# Patient Record
Sex: FEMALE | Race: WHITE | NOT HISPANIC OR LATINO
[De-identification: names, ages, dates, MRNs, and addresses within clinical notes are randomized per-mention and may not be internally consistent; named-entity substitution may affect disease eponyms.]

---

## 2017-02-15 ENCOUNTER — APPOINTMENT (OUTPATIENT)
Dept: HOME HEALTH SERVICES | Facility: HOME HEALTH | Age: 82
End: 2017-02-15

## 2017-02-15 VITALS
HEART RATE: 65 BPM | RESPIRATION RATE: 16 BRPM | SYSTOLIC BLOOD PRESSURE: 130 MMHG | DIASTOLIC BLOOD PRESSURE: 80 MMHG | OXYGEN SATURATION: 97 %

## 2017-02-15 DIAGNOSIS — Z87.898 PERSONAL HISTORY OF OTHER SPECIFIED CONDITIONS: ICD-10-CM

## 2017-05-10 ENCOUNTER — APPOINTMENT (OUTPATIENT)
Dept: HOME HEALTH SERVICES | Facility: HOME HEALTH | Age: 82
End: 2017-05-10

## 2017-05-10 VITALS
DIASTOLIC BLOOD PRESSURE: 80 MMHG | OXYGEN SATURATION: 96 % | RESPIRATION RATE: 16 BRPM | SYSTOLIC BLOOD PRESSURE: 160 MMHG | HEART RATE: 70 BPM

## 2017-09-14 ENCOUNTER — APPOINTMENT (OUTPATIENT)
Dept: HOME HEALTH SERVICES | Facility: HOME HEALTH | Age: 82
End: 2017-09-14
Payer: MEDICARE

## 2017-09-14 VITALS
HEART RATE: 71 BPM | SYSTOLIC BLOOD PRESSURE: 150 MMHG | RESPIRATION RATE: 16 BRPM | OXYGEN SATURATION: 97 % | DIASTOLIC BLOOD PRESSURE: 80 MMHG

## 2017-09-14 PROCEDURE — 99349 HOME/RES VST EST MOD MDM 40: CPT

## 2017-12-05 ENCOUNTER — CLINICAL ADVICE (OUTPATIENT)
Age: 82
End: 2017-12-05

## 2017-12-06 ENCOUNTER — CLINICAL ADVICE (OUTPATIENT)
Age: 82
End: 2017-12-06

## 2017-12-07 ENCOUNTER — APPOINTMENT (OUTPATIENT)
Dept: HOME HEALTH SERVICES | Facility: HOME HEALTH | Age: 82
End: 2017-12-07

## 2017-12-07 VITALS
TEMPERATURE: 98 F | SYSTOLIC BLOOD PRESSURE: 108 MMHG | RESPIRATION RATE: 16 BRPM | HEART RATE: 74 BPM | OXYGEN SATURATION: 98 % | DIASTOLIC BLOOD PRESSURE: 70 MMHG

## 2017-12-21 ENCOUNTER — APPOINTMENT (OUTPATIENT)
Dept: HOME HEALTH SERVICES | Facility: HOME HEALTH | Age: 82
End: 2017-12-21

## 2018-01-22 ENCOUNTER — APPOINTMENT (OUTPATIENT)
Dept: HOME HEALTH SERVICES | Facility: HOME HEALTH | Age: 83
End: 2018-01-22
Payer: MEDICARE

## 2018-01-22 VITALS
HEART RATE: 71 BPM | OXYGEN SATURATION: 95 % | DIASTOLIC BLOOD PRESSURE: 70 MMHG | SYSTOLIC BLOOD PRESSURE: 120 MMHG | RESPIRATION RATE: 16 BRPM

## 2018-01-22 PROCEDURE — 99349 HOME/RES VST EST MOD MDM 40: CPT

## 2018-03-08 ENCOUNTER — MEDICATION RENEWAL (OUTPATIENT)
Age: 83
End: 2018-03-08

## 2018-03-09 ENCOUNTER — RX RENEWAL (OUTPATIENT)
Age: 83
End: 2018-03-09

## 2018-05-17 ENCOUNTER — APPOINTMENT (OUTPATIENT)
Dept: HOME HEALTH SERVICES | Facility: HOME HEALTH | Age: 83
End: 2018-05-17
Payer: MEDICARE

## 2018-05-17 VITALS
SYSTOLIC BLOOD PRESSURE: 130 MMHG | DIASTOLIC BLOOD PRESSURE: 70 MMHG | RESPIRATION RATE: 16 BRPM | OXYGEN SATURATION: 95 % | HEART RATE: 80 BPM

## 2018-05-17 DIAGNOSIS — Z71.89 OTHER SPECIFIED COUNSELING: ICD-10-CM

## 2018-05-17 DIAGNOSIS — E55.9 VITAMIN D DEFICIENCY, UNSPECIFIED: ICD-10-CM

## 2018-05-17 PROCEDURE — 99348 HOME/RES VST EST LOW MDM 30: CPT

## 2018-10-11 ENCOUNTER — APPOINTMENT (OUTPATIENT)
Dept: HOME HEALTH SERVICES | Facility: HOME HEALTH | Age: 83
End: 2018-10-11

## 2018-12-26 ENCOUNTER — APPOINTMENT (OUTPATIENT)
Dept: HOME HEALTH SERVICES | Facility: HOME HEALTH | Age: 83
End: 2018-12-26
Payer: MEDICARE

## 2018-12-26 VITALS
SYSTOLIC BLOOD PRESSURE: 160 MMHG | RESPIRATION RATE: 16 BRPM | DIASTOLIC BLOOD PRESSURE: 80 MMHG | OXYGEN SATURATION: 94 % | HEART RATE: 94 BPM

## 2018-12-26 PROCEDURE — G0008: CPT

## 2018-12-26 PROCEDURE — 90662 IIV NO PRSV INCREASED AG IM: CPT

## 2018-12-26 PROCEDURE — 99348 HOME/RES VST EST LOW MDM 30: CPT | Mod: 25

## 2019-03-07 ENCOUNTER — APPOINTMENT (OUTPATIENT)
Dept: HOME HEALTH SERVICES | Facility: HOME HEALTH | Age: 84
End: 2019-03-07

## 2019-04-29 ENCOUNTER — APPOINTMENT (OUTPATIENT)
Dept: HOME HEALTH SERVICES | Facility: HOME HEALTH | Age: 84
End: 2019-04-29

## 2019-06-05 ENCOUNTER — APPOINTMENT (OUTPATIENT)
Dept: HOME HEALTH SERVICES | Facility: HOME HEALTH | Age: 84
End: 2019-06-05

## 2019-06-10 ENCOUNTER — APPOINTMENT (OUTPATIENT)
Dept: HOME HEALTH SERVICES | Facility: HOME HEALTH | Age: 84
End: 2019-06-10

## 2019-08-22 ENCOUNTER — APPOINTMENT (OUTPATIENT)
Dept: HOME HEALTH SERVICES | Facility: HOME HEALTH | Age: 84
End: 2019-08-22
Payer: MEDICARE

## 2019-08-22 VITALS
OXYGEN SATURATION: 96 % | RESPIRATION RATE: 16 BRPM | DIASTOLIC BLOOD PRESSURE: 80 MMHG | HEART RATE: 73 BPM | SYSTOLIC BLOOD PRESSURE: 140 MMHG

## 2019-08-22 DIAGNOSIS — R60.0 LOCALIZED EDEMA: ICD-10-CM

## 2019-08-22 DIAGNOSIS — H91.93 UNSPECIFIED HEARING LOSS, BILATERAL: ICD-10-CM

## 2019-08-22 PROCEDURE — 99349 HOME/RES VST EST MOD MDM 40: CPT

## 2019-08-22 NOTE — PHYSICAL EXAM
[No Acute Distress] : no acute distress [Normal Sclera/Conjunctiva] : normal sclera/conjunctiva [Normal Outer Ear/Nose] : the ears and nose were normal in appearance [No JVD] : no jugular venous distention [No Respiratory Distress] : no respiratory distress [Clear to Auscultation] : lungs were clear to auscultation bilaterally [Normal Rate] : heart rate was normal  [Regular Rhythm] : with a regular rhythm [Normal S1, S2] : normal S1 and S2 [No Murmurs] : no murmurs heard [Normal Bowel Sounds] : normal bowel sounds [Non Tender] : non-tender [Soft] : abdomen soft [No Joint Swelling] : no joint swelling seen [No Rash] : no rash [No Motor Deficits] : the motor exam was normal [Normal Affect] : the affect was normal [de-identified] : pleasant [de-identified] : no edema

## 2019-08-22 NOTE — COUNSELING
[Full Code] : Code Status: Full Code [Completed Medical Orders for Life-Sustaining Treatment] : completed medical orders for life-sustaining treatment [Limited] : Treatment Guidelines: Limited [Trial of Intubation] : Intubation: Trial of Intubation [Continue diet as tolerated] : continue diet as tolerated based on goals of care [Normal Weight - ( BMI  <25 )] : normal weight - ( BMI  <25 ) [Non - Smoker] : non-smoker [Smoke/CO Detectors] : smoke/CO detectors [Medical alert] : medical alert [Use grab bars] : use grab bars [Remove clutter and unsafe carpeting to avoid falls] : remove clutter and unsafe carpeting to avoid falls [Use assistive device to avoid falls] : use assistive device to avoid falls [Last Verification Date: _____] : [facility] Completion/last verification date: [unfilled]

## 2019-08-22 NOTE — HISTORY OF PRESENT ILLNESS
[Patient] : patient [Family Member] : family member [FreeTextEntry1] : gait instability [FreeTextEntry2] : PMH HTN, peripheral edema, mild dementia  \par \par \par Dementia- forgetful, repetitive.  no trouble swallowing. Some incontinence. Sometimes screams for unclear reasons. wants to go out. \par edema- Rarely uses lasix 20 PRN. \par appetite good, no trouble swallowing, no constipation, sleeps well

## 2019-10-03 ENCOUNTER — APPOINTMENT (OUTPATIENT)
Dept: HOME HEALTH SERVICES | Facility: HOME HEALTH | Age: 84
End: 2019-10-03

## 2019-10-10 ENCOUNTER — APPOINTMENT (OUTPATIENT)
Dept: HOME HEALTH SERVICES | Facility: HOME HEALTH | Age: 84
End: 2019-10-10

## 2019-11-01 ENCOUNTER — APPOINTMENT (OUTPATIENT)
Dept: HOME HEALTH SERVICES | Facility: HOME HEALTH | Age: 84
End: 2019-11-01

## 2019-12-10 ENCOUNTER — APPOINTMENT (OUTPATIENT)
Dept: HOME HEALTH SERVICES | Facility: HOME HEALTH | Age: 84
End: 2019-12-10

## 2020-01-02 ENCOUNTER — APPOINTMENT (OUTPATIENT)
Dept: HOME HEALTH SERVICES | Facility: HOME HEALTH | Age: 85
End: 2020-01-02
Payer: MEDICARE

## 2020-01-02 VITALS
OXYGEN SATURATION: 94 % | RESPIRATION RATE: 16 BRPM | SYSTOLIC BLOOD PRESSURE: 120 MMHG | HEART RATE: 75 BPM | DIASTOLIC BLOOD PRESSURE: 75 MMHG

## 2020-01-02 DIAGNOSIS — F03.90 UNSPECIFIED DEMENTIA W/OUT BEHAVIORAL DISTURBANCE: ICD-10-CM

## 2020-01-02 DIAGNOSIS — I10 ESSENTIAL (PRIMARY) HYPERTENSION: ICD-10-CM

## 2020-01-02 DIAGNOSIS — Z23 ENCOUNTER FOR IMMUNIZATION: ICD-10-CM

## 2020-01-02 PROCEDURE — 99348 HOME/RES VST EST LOW MDM 30: CPT | Mod: 25

## 2020-01-02 PROCEDURE — G0008: CPT

## 2020-01-02 PROCEDURE — 90662 IIV NO PRSV INCREASED AG IM: CPT

## 2020-01-02 NOTE — COUNSELING
[Normal Weight - ( BMI  <25 )] : normal weight - ( BMI  <25 ) [Continue diet as tolerated] : continue diet as tolerated based on goals of care [Smoke/CO Detectors] : smoke/CO detectors [Non - Smoker] : non-smoker [Medical alert] : medical alert [Use grab bars] : use grab bars [Use assistive device to avoid falls] : use assistive device to avoid falls [Remove clutter and unsafe carpeting to avoid falls] : remove clutter and unsafe carpeting to avoid falls [Completed Medical Orders for Life-Sustaining Treatment] : completed medical orders for life-sustaining treatment [Full Code] : Code Status: Full Code [Limited] : Treatment Guidelines: Limited [[facility] Date: _____] [facility] Completion/last verification date: [unfilled] [Trial of Intubation] : Intubation: Trial of Intubation

## 2020-01-02 NOTE — HISTORY OF PRESENT ILLNESS
[Patient] : patient [Family Member] : family member [FreeTextEntry1] : gait instability [FreeTextEntry2] : PMH HTN, peripheral edema, mild dementia  \par \par \par Dementia- forgetful, repetitive.  no trouble swallowing. Some incontinence. no falls\par edema- Rarely uses lasix 20 PRN. \par appetite good,  no constipation, sleeps well

## 2020-01-02 NOTE — PHYSICAL EXAM
[No Acute Distress] : no acute distress [Normal Sclera/Conjunctiva] : normal sclera/conjunctiva [Normal Outer Ear/Nose] : the ears and nose were normal in appearance [No JVD] : no jugular venous distention [Normal Rate] : heart rate was normal  [No Respiratory Distress] : no respiratory distress [Clear to Auscultation] : lungs were clear to auscultation bilaterally [Regular Rhythm] : with a regular rhythm [Normal S1, S2] : normal S1 and S2 [No Murmurs] : no murmurs heard [Normal Bowel Sounds] : normal bowel sounds [Non Tender] : non-tender [Soft] : abdomen soft [No Joint Swelling] : no joint swelling seen [No Motor Deficits] : the motor exam was normal [No Rash] : no rash [Normal Affect] : the affect was normal [de-identified] : pleasant [de-identified] : no edema

## 2020-02-22 ENCOUNTER — TRANSCRIPTION ENCOUNTER (OUTPATIENT)
Age: 85
End: 2020-02-22

## 2020-02-22 ENCOUNTER — INPATIENT (INPATIENT)
Facility: HOSPITAL | Age: 85
LOS: 4 days | Discharge: HOME | DRG: 563 | End: 2020-02-27
Attending: INTERNAL MEDICINE | Admitting: STUDENT IN AN ORGANIZED HEALTH CARE EDUCATION/TRAINING PROGRAM
Payer: MEDICARE

## 2020-02-22 VITALS
OXYGEN SATURATION: 96 % | DIASTOLIC BLOOD PRESSURE: 78 MMHG | RESPIRATION RATE: 18 BRPM | SYSTOLIC BLOOD PRESSURE: 115 MMHG | TEMPERATURE: 97 F | HEART RATE: 86 BPM | WEIGHT: 100.09 LBS

## 2020-02-22 DIAGNOSIS — Z02.9 ENCOUNTER FOR ADMINISTRATIVE EXAMINATIONS, UNSPECIFIED: ICD-10-CM

## 2020-02-22 DIAGNOSIS — F03.90 UNSPECIFIED DEMENTIA WITHOUT BEHAVIORAL DISTURBANCE: ICD-10-CM

## 2020-02-22 DIAGNOSIS — R26.2 DIFFICULTY IN WALKING, NOT ELSEWHERE CLASSIFIED: ICD-10-CM

## 2020-02-22 DIAGNOSIS — R60.0 LOCALIZED EDEMA: ICD-10-CM

## 2020-02-22 DIAGNOSIS — S43.006A UNSPECIFIED DISLOCATION OF UNSPECIFIED SHOULDER JOINT, INITIAL ENCOUNTER: ICD-10-CM

## 2020-02-22 DIAGNOSIS — S42.291A OTHER DISPLACED FRACTURE OF UPPER END OF RIGHT HUMERUS, INITIAL ENCOUNTER FOR CLOSED FRACTURE: ICD-10-CM

## 2020-02-22 DIAGNOSIS — R79.89 OTHER SPECIFIED ABNORMAL FINDINGS OF BLOOD CHEMISTRY: ICD-10-CM

## 2020-02-22 LAB
ALBUMIN SERPL ELPH-MCNC: 4 G/DL — SIGNIFICANT CHANGE UP (ref 3.3–5)
ALP SERPL-CCNC: 120 U/L — SIGNIFICANT CHANGE UP (ref 40–120)
ALT FLD-CCNC: 6 U/L — LOW (ref 10–45)
ANION GAP SERPL CALC-SCNC: 14 MMOL/L — SIGNIFICANT CHANGE UP (ref 5–17)
ANION GAP SERPL CALC-SCNC: 15 MMOL/L — SIGNIFICANT CHANGE UP (ref 5–17)
APTT BLD: 27.8 SEC — SIGNIFICANT CHANGE UP (ref 27.5–36.3)
AST SERPL-CCNC: 24 U/L — SIGNIFICANT CHANGE UP (ref 10–40)
BASOPHILS # BLD AUTO: 0 K/UL — SIGNIFICANT CHANGE UP (ref 0–0.2)
BASOPHILS NFR BLD AUTO: 0 % — SIGNIFICANT CHANGE UP (ref 0–2)
BILIRUB SERPL-MCNC: 0.6 MG/DL — SIGNIFICANT CHANGE UP (ref 0.2–1.2)
BUN SERPL-MCNC: 25 MG/DL — HIGH (ref 7–23)
BUN SERPL-MCNC: 26 MG/DL — HIGH (ref 7–23)
CALCIUM SERPL-MCNC: 8.7 MG/DL — SIGNIFICANT CHANGE UP (ref 8.4–10.5)
CALCIUM SERPL-MCNC: 9.2 MG/DL — SIGNIFICANT CHANGE UP (ref 8.4–10.5)
CHLORIDE SERPL-SCNC: 104 MMOL/L — SIGNIFICANT CHANGE UP (ref 96–108)
CHLORIDE SERPL-SCNC: 106 MMOL/L — SIGNIFICANT CHANGE UP (ref 96–108)
CK MB CFR SERPL CALC: 7.7 NG/ML — HIGH (ref 0–3.8)
CK SERPL-CCNC: 125 U/L — SIGNIFICANT CHANGE UP (ref 25–170)
CK SERPL-CCNC: 69 U/L — SIGNIFICANT CHANGE UP (ref 25–170)
CO2 SERPL-SCNC: 21 MMOL/L — LOW (ref 22–31)
CO2 SERPL-SCNC: 22 MMOL/L — SIGNIFICANT CHANGE UP (ref 22–31)
CREAT SERPL-MCNC: 0.76 MG/DL — SIGNIFICANT CHANGE UP (ref 0.5–1.3)
CREAT SERPL-MCNC: 0.79 MG/DL — SIGNIFICANT CHANGE UP (ref 0.5–1.3)
EOSINOPHIL # BLD AUTO: 0 K/UL — SIGNIFICANT CHANGE UP (ref 0–0.5)
EOSINOPHIL NFR BLD AUTO: 0 % — SIGNIFICANT CHANGE UP (ref 0–6)
GAS PNL BLDV: SIGNIFICANT CHANGE UP
GLUCOSE SERPL-MCNC: 128 MG/DL — HIGH (ref 70–99)
GLUCOSE SERPL-MCNC: 148 MG/DL — HIGH (ref 70–99)
HCT VFR BLD CALC: 33.8 % — LOW (ref 34.5–45)
HCT VFR BLD CALC: 40.7 % — SIGNIFICANT CHANGE UP (ref 34.5–45)
HGB BLD-MCNC: 10.9 G/DL — LOW (ref 11.5–15.5)
HGB BLD-MCNC: 13.1 G/DL — SIGNIFICANT CHANGE UP (ref 11.5–15.5)
INR BLD: 0.97 RATIO — SIGNIFICANT CHANGE UP (ref 0.88–1.16)
LYMPHOCYTES # BLD AUTO: 0.74 K/UL — LOW (ref 1–3.3)
LYMPHOCYTES # BLD AUTO: 6.2 % — LOW (ref 13–44)
MCHC RBC-ENTMCNC: 31.1 PG — SIGNIFICANT CHANGE UP (ref 27–34)
MCHC RBC-ENTMCNC: 31.3 PG — SIGNIFICANT CHANGE UP (ref 27–34)
MCHC RBC-ENTMCNC: 32.2 GM/DL — SIGNIFICANT CHANGE UP (ref 32–36)
MCHC RBC-ENTMCNC: 32.2 GM/DL — SIGNIFICANT CHANGE UP (ref 32–36)
MCV RBC AUTO: 96.3 FL — SIGNIFICANT CHANGE UP (ref 80–100)
MCV RBC AUTO: 97.4 FL — SIGNIFICANT CHANGE UP (ref 80–100)
MONOCYTES # BLD AUTO: 0.72 K/UL — SIGNIFICANT CHANGE UP (ref 0–0.9)
MONOCYTES NFR BLD AUTO: 6.1 % — SIGNIFICANT CHANGE UP (ref 2–14)
NEUTROPHILS # BLD AUTO: 10.4 K/UL — HIGH (ref 1.8–7.4)
NEUTROPHILS NFR BLD AUTO: 87.7 % — HIGH (ref 43–77)
NRBC # BLD: 0 /100 WBCS — SIGNIFICANT CHANGE UP (ref 0–0)
NT-PROBNP SERPL-SCNC: 4085 PG/ML — HIGH (ref 0–300)
PLATELET # BLD AUTO: 265 K/UL — SIGNIFICANT CHANGE UP (ref 150–400)
PLATELET # BLD AUTO: 336 K/UL — SIGNIFICANT CHANGE UP (ref 150–400)
POTASSIUM SERPL-MCNC: 5.1 MMOL/L — SIGNIFICANT CHANGE UP (ref 3.5–5.3)
POTASSIUM SERPL-MCNC: 6 MMOL/L — HIGH (ref 3.5–5.3)
POTASSIUM SERPL-SCNC: 5.1 MMOL/L — SIGNIFICANT CHANGE UP (ref 3.5–5.3)
POTASSIUM SERPL-SCNC: 6 MMOL/L — HIGH (ref 3.5–5.3)
PROT SERPL-MCNC: 8.2 G/DL — SIGNIFICANT CHANGE UP (ref 6–8.3)
PROTHROM AB SERPL-ACNC: 11.1 SEC — SIGNIFICANT CHANGE UP (ref 10–12.9)
RBC # BLD: 3.51 M/UL — LOW (ref 3.8–5.2)
RBC # BLD: 4.18 M/UL — SIGNIFICANT CHANGE UP (ref 3.8–5.2)
RBC # FLD: 13 % — SIGNIFICANT CHANGE UP (ref 10.3–14.5)
RBC # FLD: 13 % — SIGNIFICANT CHANGE UP (ref 10.3–14.5)
SODIUM SERPL-SCNC: 140 MMOL/L — SIGNIFICANT CHANGE UP (ref 135–145)
SODIUM SERPL-SCNC: 142 MMOL/L — SIGNIFICANT CHANGE UP (ref 135–145)
TROPONIN T, HIGH SENSITIVITY RESULT: 199 NG/L — HIGH (ref 0–51)
TROPONIN T, HIGH SENSITIVITY RESULT: 219 NG/L — HIGH (ref 0–51)
TROPONIN T, HIGH SENSITIVITY RESULT: 245 NG/L — HIGH (ref 0–51)
WBC # BLD: 10.59 K/UL — HIGH (ref 3.8–10.5)
WBC # BLD: 11.86 K/UL — HIGH (ref 3.8–10.5)
WBC # FLD AUTO: 10.59 K/UL — HIGH (ref 3.8–10.5)
WBC # FLD AUTO: 11.86 K/UL — HIGH (ref 3.8–10.5)

## 2020-02-22 PROCEDURE — 72170 X-RAY EXAM OF PELVIS: CPT | Mod: 26

## 2020-02-22 PROCEDURE — 71045 X-RAY EXAM CHEST 1 VIEW: CPT | Mod: 26

## 2020-02-22 PROCEDURE — 72125 CT NECK SPINE W/O DYE: CPT | Mod: 26

## 2020-02-22 PROCEDURE — 99222 1ST HOSP IP/OBS MODERATE 55: CPT

## 2020-02-22 PROCEDURE — 99285 EMERGENCY DEPT VISIT HI MDM: CPT

## 2020-02-22 PROCEDURE — 73020 X-RAY EXAM OF SHOULDER: CPT | Mod: 26,59,RT

## 2020-02-22 PROCEDURE — 73070 X-RAY EXAM OF ELBOW: CPT | Mod: 26,RT

## 2020-02-22 PROCEDURE — 93010 ELECTROCARDIOGRAM REPORT: CPT

## 2020-02-22 PROCEDURE — 73590 X-RAY EXAM OF LOWER LEG: CPT | Mod: 26,LT

## 2020-02-22 PROCEDURE — 70450 CT HEAD/BRAIN W/O DYE: CPT | Mod: 26

## 2020-02-22 PROCEDURE — 73030 X-RAY EXAM OF SHOULDER: CPT | Mod: 26,RT

## 2020-02-22 RX ORDER — ACETAMINOPHEN 500 MG
650 TABLET ORAL EVERY 6 HOURS
Refills: 0 | Status: DISCONTINUED | OUTPATIENT
Start: 2020-02-22 | End: 2020-02-27

## 2020-02-22 RX ORDER — HEPARIN SODIUM 5000 [USP'U]/ML
5000 INJECTION INTRAVENOUS; SUBCUTANEOUS EVERY 8 HOURS
Refills: 0 | Status: DISCONTINUED | OUTPATIENT
Start: 2020-02-22 | End: 2020-02-27

## 2020-02-22 RX ORDER — ACETAMINOPHEN 500 MG
650 TABLET ORAL ONCE
Refills: 0 | Status: COMPLETED | OUTPATIENT
Start: 2020-02-22 | End: 2020-02-22

## 2020-02-22 RX ADMIN — Medication 650 MILLIGRAM(S): at 21:56

## 2020-02-22 RX ADMIN — HEPARIN SODIUM 5000 UNIT(S): 5000 INJECTION INTRAVENOUS; SUBCUTANEOUS at 21:56

## 2020-02-22 RX ADMIN — Medication 650 MILLIGRAM(S): at 15:23

## 2020-02-22 RX ADMIN — Medication 650 MILLIGRAM(S): at 19:35

## 2020-02-22 RX ADMIN — Medication 650 MILLIGRAM(S): at 23:15

## 2020-02-22 NOTE — H&P ADULT - PROBLEM SELECTOR PLAN 6
Transitions of Care Status:  1.  Name of PCP:  House calls/kristy   2.  PCP Contacted on Admission: [ ] Y    [ ] N    3.  PCP contacted at Discharge: [ ] Y    [ ] N    [ ] N/A  4.  Post-Discharge Appointment Date and Location:  5.  Summary of Handoff given to PCP:

## 2020-02-22 NOTE — ED CLERICAL - NS ED CLERK NOTE PRE-ARRIVAL INFORMATION; ADDITIONAL PRE-ARRIVAL INFORMATION
This patient is enrolled in the [facility] Calls Program and receives comprehensive home-based primary care.  Pt was found on the ground by daughter this morning around 11 am, in pain and unable to get pt back up.     - To obtain additional clinical information , or to discuss any questions or concerns, you can call the [facility] Calls team at [phone], 24 hours a day.    - If discharged, this patient will be followed up by the [facility] Call[facility] team within 2 days.    - If this patient requires admission, please use the hospitalist service.

## 2020-02-22 NOTE — ED PROVIDER NOTE - OBJECTIVE STATEMENT
89F with PMH of chronic lymphedema on Lasix BIBEMS after being found down at home by family at 11 AM. Unknown how long she was down. Last seen at home last night. Pt alert in no distress reporting right upper arm pain. Denies any other symptoms. Is unsure how she fell. Not on AC.

## 2020-02-22 NOTE — H&P ADULT - PROBLEM SELECTOR PLAN 2
EKG without any acute ischemic changes.  Patient does not report any chest pain or shortness of breath  -Continue to trend tronopin.  If continues to rise will need to consider NSTEMI in differential and obtain cardiology consultation.  -Echocardiogram

## 2020-02-22 NOTE — ED ADULT NURSE NOTE - OBJECTIVE STATEMENT
89y/o F coming to the ED s/p Fall. Pt's daughter states she went to visit her at home when she was found on the floor around 11am, unknown how long she was on the floor for. 89y/o F coming to the ED s/p Fall. Pt's daughter states she went to visit her at home when she was found on the floor around 11am, unknown how long she was on the floor for. Pt does not recall falling on the floor or being on the floor. Pt's is able to tell me her name, birthday, and place but is not able to tell me the year, month or president. Unknown LOC or hitting head but when pt was found she was awake. As per family, pt is acting her normal self. Pt is c/o of R shoulder pain, bruising and deformity noted. Pt denies any CP/SOB/Fever/Chills/N/V/D. Positive peripheral pulses noted with cap refill 2seconds. Pt denies any numbness or tingling. Pt unable to move R arm d/t pain but able to move all other extremities. B/L edema in lower legs +2, as per family pt is taking lasix. IV placed. Labs collected and sent.

## 2020-02-22 NOTE — H&P ADULT - HISTORY OF PRESENT ILLNESS
89 yr old F w/ no signficant past medical history presents after being found on the floor.  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  She lives around the block from her mother and goes there several times a day to check up on her and feed her.  At night her mom wears a diaper.  As per her daughter she went over to her house to check up on her and found her on the floor, she normally is supposed to use a walker to walk however it appears today she got out of bed without using a walker, she found her down it is unclear how long she was down for.  Patient does not complain of chest pain or shortness of breath

## 2020-02-22 NOTE — H&P ADULT - PROBLEM SELECTOR PLAN 1
Patient with non displaced humeral head fracture on x-ray   Awaiting official orthopedic evaluation, unlikely to be surgical intervention   -Continue with pain control with Acetaminophen as needed

## 2020-02-22 NOTE — ED PROVIDER NOTE - PROGRESS NOTE DETAILS
Attending MD Almazan: trop 245 noted, no active chest pain and ecg without diagnostic ischemi c changes, will continue to cycle biomarkers.

## 2020-02-22 NOTE — ED PROVIDER NOTE - CARE PLAN
Principal Discharge DX:	Humeral head fracture, right, closed, initial encounter  Secondary Diagnosis:	Elevated troponin

## 2020-02-22 NOTE — H&P ADULT - ASSESSMENT
89 yr old F w/ no significant past medical history presents after fall found to have R humeral head fracture

## 2020-02-22 NOTE — H&P ADULT - NSHPLABSRESULTS_GEN_ALL_CORE
LABS:                         13.1   11.86 )-----------( 336      ( 22 Feb 2020 15:48 )             40.7     02-22    140  |  104  |  25<H>  ----------------------------<  148<H>  6.0<H>   |  22  |  0.76    Ca    9.2      22 Feb 2020 15:48    TPro  8.2  /  Alb  4.0  /  TBili  0.6  /  DBili  x   /  AST  24  /  ALT  6<L>  /  AlkPhos  120  02-22    PT/INR - ( 22 Feb 2020 15:48 )   PT: 11.1 sec;   INR: 0.97 ratio         PTT - ( 22 Feb 2020 15:48 )  PTT:27.8 sec    CARDIAC MARKERS ( 22 Feb 2020 15:48 )  x     / x     / 125 U/L / x     / x          Serum Pro-Brain Natriuretic Peptide: 4085 pg/mL (02-22 @ 15:48)      Records reviewed from prior hospitalization.  Labs reviewed remarkable for   EKG personally reviewed   CXR personally reviewed         < from: Xray Shoulder 2 Views, Right (02.22.20 @ 16:00) >      INTERPRETATION:  acute mildy impacted fracture of the right humeral head  the glenohumeral joint appears to be in anatomic alignment      < end of copied text >

## 2020-02-22 NOTE — ED ADULT NURSE REASSESSMENT NOTE - NS ED NURSE REASSESS COMMENT FT1
Received report from Libby PERALES. Pt resting comfortably with family at bedside. Pt AAOx2, no c/o headache or dizziness at this time. Right shoulder pain that worsens with movement, bruising and swelling noted. Pt waiting to Telemetry bed upstairs, will continue to monitor. VSS.

## 2020-02-22 NOTE — ED PROVIDER NOTE - CLINICAL SUMMARY MEDICAL DECISION MAKING FREE TEXT BOX
Found down. Details surrounding fall vs. syncope unknown. Pt well appearing, at baseline mental status per family. Will assess for fracture, SDH, ICH, ACS, arrythmia, rhabdo with labs/imaging and likely admit.

## 2020-02-22 NOTE — CONSULT NOTE ADULT - ASSESSMENT
A/P: Pt is a 89y Female with a R Proximal Humerus Fracture.  -Closed Fracture, NV Intact  -Pain control  -XR R Shoulder/Axillary: Fracture of R proximal humerus involving the surgical neck. No glenohumeral dislocation.   -Pt placed in sling.  -Rest, ice, elevate affected extremity.  -NWB affected extremity in sling.  -Discussed signs and symptoms of compartment syndrome with patient. Pt informed to follow up immediately should these signs or symptoms develop. Pt expressed understanding and all questions were answered.  -Pt informed to remove all jewelry from affected limb.  -No planned orthopaedic surgical intervention.  -Please follow up in office within 5-7 days of discharge with Dr. Manriquez. Call office for appointment.  -Ortho stable for discharge.    Estela Carrasco M.D.  PGY-2 Orthopaedic Surgery

## 2020-02-22 NOTE — ED PROVIDER NOTE - ATTENDING CONTRIBUTION TO CARE
Attending MD Almazan:  I personally have seen and examined this patient.  Resident note reviewed and agree on plan of care and except where noted.  See HPI, PE, and MDM for details.      89F presenting from home after being found down, unknown if ?syncope vs fall as patient is not a reliable historian, exam notable for swelling and ecchymosis to right shoulder, otherwise no visible trauma. Plan for CT head, C spine, XRs RUE, CXR, pelvis XR, labs and ECG to evaluate for metabolic or possible dysrhythmic precipitant for presentation

## 2020-02-22 NOTE — ED PROVIDER NOTE - NS ED ROS FT
ROS:  GENERAL: No fever, no chills  EYES: no change in vision  HEENT: no trouble swallowing, no trouble speaking  CARDIAC: no chest pain  PULMONARY: no cough, no shortness of breath  GI: no abdominal pain, no nausea, no vomiting, no diarrhea, no constipation  : No dysuria, no frequency, no change in appearance, or odor of urine  SKIN: no rashes  NEURO: no headache, no weakness  MSK: +RUE pain     Claudy Finney PGY2

## 2020-02-22 NOTE — ED PROVIDER NOTE - PHYSICAL EXAMINATION
Gen: AAOx3, non-toxic  Head: NCAT  HEENT: EOMI, oral mucosa moist, normal conjunctiva  Lung: CTAB, no respiratory distress, no wheezes/rhonchi/rales B/L, speaking in full sentences  CV: RRR, no murmurs, rubs or gallops  Abd: soft, NTND, no guarding, no CVA tenderness  MSK: TTP over RUE. no midline spinal TTP.   Neuro: No focal sensory or motor deficits, normal CN exam   Skin: bruising over RUE. bruising over LLE   Psych: normal affect.     ~Claudy Finney PGY2

## 2020-02-22 NOTE — H&P ADULT - PROBLEM SELECTOR PLAN 5
PT consult.  patient at home walks with a walker.  Without the use of her R arm is going to be unable to use a walker

## 2020-02-22 NOTE — CONSULT NOTE ADULT - SUBJECTIVE AND OBJECTIVE BOX
Orthopaedic Surgery Consult Note    Pt is a R-hand dominant 89y female presenting with R shoulder pain s/p mechanical fall. Pt denies numbness, tingling, paresthesias in affected limb. Pt denies headstrike or LOC and denies any other orthopaedic injuries at this time. Pt denies taking blood thinners. Denies fevers, dizziness, CP, SOB, N/V, calf pain.    PMHx:  Dementia  Gout    PSHx:  S/P hysterectomy    Allergies:  No Known Allergies    Medications:  acetaminophen   Tablet .. 650 milliGRAM(s) Oral every 6 hours PRN  heparin  Injectable 5000 Unit(s) SubCutaneous every 8 hours    Social: Denies tobacco, EtOH, or illicit drug use.    Labs:                        13.1   11.86 )-----------( 336      ( 22 Feb 2020 15:48 )             40.7     02-22    142  |  106  |  26<H>  ----------------------------<  128<H>  5.1   |  21<L>  |  0.79    Ca    8.7      22 Feb 2020 19:28    TPro  8.2  /  Alb  4.0  /  TBili  0.6  /  DBili  x   /  AST  24  /  ALT  6<L>  /  AlkPhos  120  02-22    PT/INR - ( 22 Feb 2020 15:48 )   PT: 11.1 sec;   INR: 0.97 ratio         PTT - ( 22 Feb 2020 15:48 )  PTT:27.8 sec    Imaging:   XR R Shoulder/Axillary: Fracture of R proximal humerus involving the surgical neck. No glenohumeral dislocation.  XR Pelvis/R Tib Fib/R Elbow: negative    Vitals:  T(C): 36.2 (02-22-20 @ 19:34), Max: 36.7 (02-22-20 @ 15:06)  HR: 77 (02-22-20 @ 19:34) (77 - 86)  BP: 124/71 (02-22-20 @ 19:34) (115/78 - 131/84)  RR: 16 (02-22-20 @ 19:34) (16 - 19)  SpO2: 98% (02-22-20 @ 19:34) (96% - 100%)    Physical Exam:  Gen: NAD, AAOx3    RUE:   Skin intact  Gross deformity of proximal humerus  +TTP about proximal humerus  + edema, ecchymosis, erythema about shoulder  No bony TTP of Elbow/Wrist/Hand/Fingers  NT with AROM/PROM of Elbow/Wrist/Hand/Fingers  +AIN/PIN/Med/Rad/Uln/Msc/Ax  SILT C5-T1  2+ Rad/Uln Pulse   Brisk cap refill  Compartments soft and compressible    Secondary Assessment:  NC/AT, NTTP of clavicles, NTTP of C-,T-,L-Spine, NTTP of Pelvis  LUE: NTTP of Shoulder, Elbow, Wrist, Hand; NT with AROM/PROM of Shoulder, Elbow, Wrist, Hand; AIN/PIN/Med/Uln/Msc/Rad/Ax intact  LEs: Able to SLR, NT with Log Roll, NT with Heel Strike, NTTP of Hips, Knees, Ankles, Feet; NT with AROM/PROM of Hips, Knees, Ankles, Feet; Q/H/Gsc/TA/EHL/FHL intact

## 2020-02-23 LAB
HCT VFR BLD CALC: 34 % — LOW (ref 34.5–45)
HGB BLD-MCNC: 11 G/DL — LOW (ref 11.5–15.5)
MCHC RBC-ENTMCNC: 31.1 PG — SIGNIFICANT CHANGE UP (ref 27–34)
MCHC RBC-ENTMCNC: 32.4 GM/DL — SIGNIFICANT CHANGE UP (ref 32–36)
MCV RBC AUTO: 96 FL — SIGNIFICANT CHANGE UP (ref 80–100)
NRBC # BLD: 0 /100 WBCS — SIGNIFICANT CHANGE UP (ref 0–0)
PLATELET # BLD AUTO: 252 K/UL — SIGNIFICANT CHANGE UP (ref 150–400)
RBC # BLD: 3.54 M/UL — LOW (ref 3.8–5.2)
RBC # FLD: 12.8 % — SIGNIFICANT CHANGE UP (ref 10.3–14.5)
TROPONIN T, HIGH SENSITIVITY RESULT: 114 NG/L — HIGH (ref 0–51)
WBC # BLD: 9 K/UL — SIGNIFICANT CHANGE UP (ref 3.8–10.5)
WBC # FLD AUTO: 9 K/UL — SIGNIFICANT CHANGE UP (ref 3.8–10.5)

## 2020-02-23 PROCEDURE — 93010 ELECTROCARDIOGRAM REPORT: CPT

## 2020-02-23 PROCEDURE — 99232 SBSQ HOSP IP/OBS MODERATE 35: CPT

## 2020-02-23 PROCEDURE — 71275 CT ANGIOGRAPHY CHEST: CPT | Mod: 26

## 2020-02-23 RX ADMIN — Medication 650 MILLIGRAM(S): at 05:29

## 2020-02-23 RX ADMIN — Medication 650 MILLIGRAM(S): at 14:50

## 2020-02-23 RX ADMIN — Medication 650 MILLIGRAM(S): at 14:22

## 2020-02-23 RX ADMIN — Medication 650 MILLIGRAM(S): at 06:24

## 2020-02-23 RX ADMIN — HEPARIN SODIUM 5000 UNIT(S): 5000 INJECTION INTRAVENOUS; SUBCUTANEOUS at 21:19

## 2020-02-23 RX ADMIN — HEPARIN SODIUM 5000 UNIT(S): 5000 INJECTION INTRAVENOUS; SUBCUTANEOUS at 05:29

## 2020-02-23 RX ADMIN — HEPARIN SODIUM 5000 UNIT(S): 5000 INJECTION INTRAVENOUS; SUBCUTANEOUS at 14:18

## 2020-02-23 NOTE — PHYSICAL THERAPY INITIAL EVALUATION ADULT - IMPAIRMENTS CONTRIBUTING TO GAIT DEVIATIONS, PT EVAL
cognition/decreased endurance min -mod unsteady , vc to stand upright/impaired balance/impaired postural control/decreased strength

## 2020-02-23 NOTE — PHYSICAL THERAPY INITIAL EVALUATION ADULT - GAIT DEVIATIONS NOTED, PT EVAL
decreased step length/decreased stride length/decreased mai/decreased weight-shifting ability/increased time in double stance

## 2020-02-23 NOTE — PHYSICAL THERAPY INITIAL EVALUATION ADULT - BED MOBILITY LIMITATIONS, REHAB EVAL
decreased ability to use arms for pushing/pulling/decreased ability to use legs for bridging/pushing/sat on eob x 6 min cg of 1 feet support on floor

## 2020-02-23 NOTE — PHYSICAL THERAPY INITIAL EVALUATION ADULT - IMPAIRMENTS CONTRIBUTING IMPAIRED BED MOBILITY, REHAB EVAL
impaired balance/decreased endurance , pt w/ min pain per pt min unsteady/pain/impaired postural control/decreased strength

## 2020-02-23 NOTE — PHYSICAL THERAPY INITIAL EVALUATION ADULT - GENERAL OBSERVATIONS, REHAB EVAL
pt received in bed with rn Estrella present pt need mod of 2 to scoot up in bed with draw sheet R ue NWB + sling , sling adjusted to proper position , Heparin injection given by rn and pain meds , pain 4/10 rest , 5/10 activity ;

## 2020-02-23 NOTE — PHYSICAL THERAPY INITIAL EVALUATION ADULT - DISCHARGE DISPOSITION, PT EVAL
rehabilitation facility/PT recommend subacute rehab to increase fxl mobility , strength, balance, endurance , fall prevention education continued ; if pt and dtr decide to take pt home need assist all fxl activity and adl's pt and dtr understood

## 2020-02-23 NOTE — PROGRESS NOTE ADULT - PROBLEM SELECTOR PLAN 1
-non displaced humeral head fracture on x-ray   -Ortho recs noted; No surgical intervention. Maintain sling. NWB RUE  -Continue with pain control with Acetaminophen as needed  -PT eval

## 2020-02-23 NOTE — PHYSICAL THERAPY INITIAL EVALUATION ADULT - REFERRING PHYSICIAN, REHAB EVAL
Almita Mosher MD ORDER PT EVAL AND TREAT bedrest 2/23/20 , NWB RUE + Maintain Sling Almita Mosher MD ORDER PT EVAL AND TREAT bedrest 2/23/20 , NWB RUE + Maintain Sling(spoke with Himanshu Huang prior to session can be OOB w/ assist as long as not symptomatic and to speak w/ NP Marixa Wu , spoke with NP who agree w/ PT bed eval, today as pt was orthostatic this am , PAT in am up to 149 HR 3 sec and trops elevated

## 2020-02-23 NOTE — PROGRESS NOTE ADULT - PROBLEM SELECTOR PLAN 2
-Trending downward.  -Asymptomatic; no complaints of cp  -EKG without any acute ischemic changes  -Continue to trend tronopin.  If continues to rise will need to consider NSTEMI in differential and obtain cardiology consultation.  -Echocardiogram

## 2020-02-23 NOTE — PROGRESS NOTE ADULT - SUBJECTIVE AND OBJECTIVE BOX
Himanshu Mello M.D. Pager Number [phone]    Patient is a 89y old  Female who presents with a chief complaint of Fall (22 Feb 2020 20:03)      SUBJECTIVE / OVERNIGHT EVENTS:  Pt seen and examined at bedside in the presence of pt's daughter/granddaughter/great granddaughters. No acute events overnight.  Pt denies cp, palpitations, sob, abd pain, N/V, fever, chills.    ROS:  All other review of systems negative    Allergies    No Known Allergies    Intolerances        MEDICATIONS  (STANDING):  heparin  Injectable 5000 Unit(s) SubCutaneous every 8 hours    MEDICATIONS  (PRN):  acetaminophen   Tablet .. 650 milliGRAM(s) Oral every 6 hours PRN Moderate Pain (4 - 6)      Vital Signs Last 24 Hrs  T(C): 36.3 (23 Feb 2020 04:32), Max: 37.1 (22 Feb 2020 21:57)  T(F): 97.4 (23 Feb 2020 04:32), Max: 98.7 (22 Feb 2020 21:57)  HR: 77 (23 Feb 2020 09:33) (70 - 86)  BP: 96/63 (23 Feb 2020 09:33) (96/63 - 131/84)  BP(mean): --  RR: 18 (23 Feb 2020 09:33) (16 - 19)  SpO2: 97% (23 Feb 2020 09:33) (96% - 100%)  CAPILLARY BLOOD GLUCOSE        I&O's Summary    22 Feb 2020 07:01  -  23 Feb 2020 07:00  --------------------------------------------------------  IN: 360 mL / OUT: 0 mL / NET: 360 mL        PHYSICAL EXAM:  GENERAL: NAD, elderly thin female  HEAD:  Atraumatic, Normocephalic  EYES: EOMI, PERRLA, conjunctiva and sclera clear  NECK: Supple, No JVD  CHEST/LUNG: Clear to auscultation bilaterally; No wheeze  HEART: Regular rate and rhythm; No murmurs, rubs, or gallops  ABDOMEN: Soft, Nontender, Nondistended; Bowel sounds present  EXTREMITIES:  2+ Peripheral Pulses, No clubbing, cyanosis, or edema  MSK: RUE in sling with limited ROM   NEUROLOGY: AAOx3, non-focal  PSYCH: calm  SKIN: No rashes or lesions    LABS:                        11.0   9.00  )-----------( 252      ( 23 Feb 2020 01:59 )             34.0     02-22    142  |  106  |  26<H>  ----------------------------<  128<H>  5.1   |  21<L>  |  0.79    Ca    8.7      22 Feb 2020 19:28    TPro  8.2  /  Alb  4.0  /  TBili  0.6  /  DBili  x   /  AST  24  /  ALT  6<L>  /  AlkPhos  120  02-22    PT/INR - ( 22 Feb 2020 15:48 )   PT: 11.1 sec;   INR: 0.97 ratio         PTT - ( 22 Feb 2020 15:48 )  PTT:27.8 sec  CARDIAC MARKERS ( 22 Feb 2020 23:10 )  x     / x     / 69 U/L / x     / 7.7 ng/mL  CARDIAC MARKERS ( 22 Feb 2020 15:48 )  x     / x     / 125 U/L / x     / x              RADIOLOGY & ADDITIONAL TESTS:  Results Reviewed:   Imaging Personally Reviewed:  Electrocardiogram Personally Reviewed:    COORDINATION OF CARE:  Care Discussed with Consultants/Other Providers [Y/N]:  Prior or Outpatient Records Reviewed [Y/N]:

## 2020-02-23 NOTE — PHYSICAL THERAPY INITIAL EVALUATION ADULT - ADDITIONAL COMMENTS
pt lives in house alone , dtr lives around the block and checks on pt few times and provides meals in course of day per chart , pt normally amb with rolling walker pt lives in house alone (daughter lives upstairs in 2 family house  -works ) no steps to negotiate ;and Jing dtr lives around the block and checks on pt few times/day and provides meals in course of day per chart , pt normally amb with rolling walker indep and some assist with adl's , has walk in shower

## 2020-02-23 NOTE — CHART NOTE - NSCHARTNOTEFT_GEN_A_CORE
GE RAY      Notified by resident Justin Bland from radiology Right upper lung peripheral ill-defined opacity is not well evaluated due to patient positioning. No pneumothorax. Thin linear calcification overlying the aortic arch appears new since the prior chest x-ray can be seen in the setting of medial deviation of intimal calcification due to aortic dissection. CT angiogram of the chest and be performed for further evaluation of the above 2 findings.  Right proximal humeral fracture. Right upper rib fracture may be chronic.    Interventions taken   Discussed with DR Funmilayo bravo to order CT angio r/o etiology from Cxray   will sign out to night NP to follow result                           Swati Merritt NP-C The cardiomediastinal silhouette is unremarkable.

## 2020-02-23 NOTE — PHYSICAL THERAPY INITIAL EVALUATION ADULT - ACTIVE RANGE OF MOTION EXAMINATION, REHAB EVAL
Left LE Active ROM was WFL (within functional limits)/Right LE Active ROM was WFL (within functional limits)/le's aarom to arom wfl's ; L ue wfl's; r shoulder /e lbow n/a in slung + fx R shoulder humeral neck ; fingers R hand and wrist wfl's/Left UE Active ROM was WFL (within functional limits)

## 2020-02-23 NOTE — PHYSICAL THERAPY INITIAL EVALUATION ADULT - IMPAIRED TRANSFERS: SIT/STAND, REHAB EVAL
decreased strength/impaired postural control/decreased endurance min to mod unsteady , vc to stand upright , perform x 2/impaired balance/cognition

## 2020-02-23 NOTE — PHYSICAL THERAPY INITIAL EVALUATION ADULT - PRECAUTIONS/LIMITATIONS, REHAB EVAL
89 yr old F w/ no signficant past medical history presents after being found on the floor.  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  She lives around the block from her mother and goes there several times a day to check up on her and feed her.  At night her mom wears a diaper.  As per her daughter she went over to her house to check up on her and found her on the floor, she normally is supposed to use a walker to walk however it appears today she got out of bed without using a walker, she found her down it is unclear how long she was down for. fall precautions/89 yr old F w/ no signficant past medical history presents after being found on the floor.  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  She lives around the block from her mother and goes there several times a day to check up on her and feed her.  At night her mom wears a diaper.  As per her daughter she went over to her house to check up on her and found her on the floor, she normally is supposed to use a walker to walk however it appears today she got out of bed without using a walker, she found her down it is unclear how long she was down for.

## 2020-02-23 NOTE — PHYSICAL THERAPY INITIAL EVALUATION ADULT - LEVEL OF INDEPENDENCE, REHAB EVAL
scoot up in bed mod of 2 w/ draw sheet minimum assist (75% patients effort)/scoot up in bed mod of 2 w/ draw sheet

## 2020-02-24 ENCOUNTER — APPOINTMENT (OUTPATIENT)
Dept: HOME HEALTH SERVICES | Facility: HOME HEALTH | Age: 85
End: 2020-02-24

## 2020-02-24 DIAGNOSIS — N17.9 ACUTE KIDNEY FAILURE, UNSPECIFIED: ICD-10-CM

## 2020-02-24 LAB
ANION GAP SERPL CALC-SCNC: 12 MMOL/L — SIGNIFICANT CHANGE UP (ref 5–17)
BUN SERPL-MCNC: 38 MG/DL — HIGH (ref 7–23)
CALCIUM SERPL-MCNC: 8.1 MG/DL — LOW (ref 8.4–10.5)
CHLORIDE SERPL-SCNC: 107 MMOL/L — SIGNIFICANT CHANGE UP (ref 96–108)
CO2 SERPL-SCNC: 22 MMOL/L — SIGNIFICANT CHANGE UP (ref 22–31)
CREAT SERPL-MCNC: 1.03 MG/DL — SIGNIFICANT CHANGE UP (ref 0.5–1.3)
GLUCOSE SERPL-MCNC: 100 MG/DL — HIGH (ref 70–99)
HCT VFR BLD CALC: 33.3 % — LOW (ref 34.5–45)
HGB BLD-MCNC: 10.4 G/DL — LOW (ref 11.5–15.5)
MCHC RBC-ENTMCNC: 30.1 PG — SIGNIFICANT CHANGE UP (ref 27–34)
MCHC RBC-ENTMCNC: 31.2 GM/DL — LOW (ref 32–36)
MCV RBC AUTO: 96.5 FL — SIGNIFICANT CHANGE UP (ref 80–100)
NRBC # BLD: 0 /100 WBCS — SIGNIFICANT CHANGE UP (ref 0–0)
PLATELET # BLD AUTO: 266 K/UL — SIGNIFICANT CHANGE UP (ref 150–400)
POTASSIUM SERPL-MCNC: 4.6 MMOL/L — SIGNIFICANT CHANGE UP (ref 3.5–5.3)
POTASSIUM SERPL-SCNC: 4.6 MMOL/L — SIGNIFICANT CHANGE UP (ref 3.5–5.3)
RBC # BLD: 3.45 M/UL — LOW (ref 3.8–5.2)
RBC # FLD: 13.5 % — SIGNIFICANT CHANGE UP (ref 10.3–14.5)
SODIUM SERPL-SCNC: 141 MMOL/L — SIGNIFICANT CHANGE UP (ref 135–145)
WBC # BLD: 11.82 K/UL — HIGH (ref 3.8–10.5)
WBC # FLD AUTO: 11.82 K/UL — HIGH (ref 3.8–10.5)

## 2020-02-24 PROCEDURE — 99233 SBSQ HOSP IP/OBS HIGH 50: CPT

## 2020-02-24 PROCEDURE — 99497 ADVNCD CARE PLAN 30 MIN: CPT | Mod: 25

## 2020-02-24 RX ORDER — SODIUM CHLORIDE 9 MG/ML
1000 INJECTION INTRAMUSCULAR; INTRAVENOUS; SUBCUTANEOUS
Refills: 0 | Status: COMPLETED | OUTPATIENT
Start: 2020-02-24 | End: 2020-02-25

## 2020-02-24 RX ORDER — TRAMADOL HYDROCHLORIDE 50 MG/1
25 TABLET ORAL EVERY 8 HOURS
Refills: 0 | Status: DISCONTINUED | OUTPATIENT
Start: 2020-02-24 | End: 2020-02-24

## 2020-02-24 RX ORDER — TRAMADOL HYDROCHLORIDE 50 MG/1
25 TABLET ORAL EVERY 8 HOURS
Refills: 0 | Status: DISCONTINUED | OUTPATIENT
Start: 2020-02-24 | End: 2020-02-27

## 2020-02-24 RX ADMIN — Medication 650 MILLIGRAM(S): at 05:35

## 2020-02-24 RX ADMIN — HEPARIN SODIUM 5000 UNIT(S): 5000 INJECTION INTRAVENOUS; SUBCUTANEOUS at 14:41

## 2020-02-24 RX ADMIN — SODIUM CHLORIDE 50 MILLILITER(S): 9 INJECTION INTRAMUSCULAR; INTRAVENOUS; SUBCUTANEOUS at 21:10

## 2020-02-24 RX ADMIN — HEPARIN SODIUM 5000 UNIT(S): 5000 INJECTION INTRAVENOUS; SUBCUTANEOUS at 05:34

## 2020-02-24 RX ADMIN — Medication 650 MILLIGRAM(S): at 22:10

## 2020-02-24 RX ADMIN — Medication 650 MILLIGRAM(S): at 21:14

## 2020-02-24 RX ADMIN — Medication 650 MILLIGRAM(S): at 06:15

## 2020-02-24 RX ADMIN — Medication 650 MILLIGRAM(S): at 11:25

## 2020-02-24 RX ADMIN — TRAMADOL HYDROCHLORIDE 25 MILLIGRAM(S): 50 TABLET ORAL at 14:39

## 2020-02-24 RX ADMIN — Medication 650 MILLIGRAM(S): at 13:51

## 2020-02-24 RX ADMIN — SODIUM CHLORIDE 50 MILLILITER(S): 9 INJECTION INTRAMUSCULAR; INTRAVENOUS; SUBCUTANEOUS at 18:51

## 2020-02-24 RX ADMIN — HEPARIN SODIUM 5000 UNIT(S): 5000 INJECTION INTRAVENOUS; SUBCUTANEOUS at 21:11

## 2020-02-24 RX ADMIN — TRAMADOL HYDROCHLORIDE 25 MILLIGRAM(S): 50 TABLET ORAL at 17:07

## 2020-02-24 NOTE — PROGRESS NOTE ADULT - SUBJECTIVE AND OBJECTIVE BOX
Authored by Dr. Shahab Nixon DO  Pager number [phone]    Patient is a 89y old  Female who presents with a chief complaint of Fall (22 Feb 2020 20:03)      SUBJECTIVE / OVERNIGHT EVENTS:  No events overnight. Patient with pain in right arm. No chest pain or SOB. Patient does not remember fall.  Patient deferred decision making to daughter, Aria who is at [facility].  Discussed possibel causes of unexplained fall including cardiac events and valvular disease. Daughter does not want any extensive workup for mother. States would not want any surgery or procedure done on mother if any cardiac testing came back positive. 	    Allergies    No Known Allergies    Intolerances        MEDICATIONS  (STANDING):  heparin  Injectable 5000 Unit(s) SubCutaneous every 8 hours    MEDICATIONS  (PRN):  acetaminophen   Tablet .. 650 milliGRAM(s) Oral every 6 hours PRN Moderate Pain (4 - 6)      Vital Signs Last 24 Hrs  Vital Signs Last 24 Hrs  T(C): 36.5 (24 Feb 2020 13:26), Max: 36.7 (23 Feb 2020 20:30)  T(F): 97.7 (24 Feb 2020 13:26), Max: 98 (23 Feb 2020 20:30)  HR: 85 (24 Feb 2020 13:26) (83 - 88)  BP: 114/77 (24 Feb 2020 13:26) (96/59 - 114/77)  BP(mean): --  RR: 18 (24 Feb 2020 13:26) (18 - 18)  SpO2: 97% (24 Feb 2020 13:26) (95% - 97%)        I&O's Summary    22 Feb 2020 07:01  -  23 Feb 2020 07:00  --------------------------------------------------------  IN: 360 mL / OUT: 0 mL / NET: 360 mL        PHYSICAL EXAM:  GENERAL: NAD, elderly thin female  HEAD:  Atraumatic, Normocephalic  EYES: , conjunctiva and sclera clear  NECK: Supple, No JVD  CHEST/LUNG: Clear to auscultation bilaterally; No wheeze  HEART: Regular rate and rhythm; No murmurs, rubs, or gallops  ABDOMEN: Soft, Nontender, Nondistended; Bowel sounds present  EXTREMITIES:  2+ Peripheral Pulses, No clubbing, cyanosis, or edema  MSK: RUE in sling with limited ROM. 3/5 hip flexor strength b/l. 5/5 dorsi and plantar flexion b/l le.   NEUROLOGY: AAOx3, non-focal  PSYCH: calm  SKIN: No rashes or lesions    LABS:                                   10.4   11.82 )-----------( 266      ( 24 Feb 2020 07:02 )             33.3   02-24    141  |  107  |  38<H>  ----------------------------<  100<H>  4.6   |  22  |  1.03    Ca    8.1<L>      24 Feb 2020 07:02    TPro  8.2  /  Alb  4.0  /  TBili  0.6  /  DBili  x   /  AST  24  /  ALT  6<L>  /  AlkPhos  120  02-22         PTT - ( 22 Feb 2020 15:48 )  PTT:27.8 sec  CARDIAC MARKERS ( 22 Feb 2020 23:10 )  x     / x     / 69 U/L / x     / 7.7 ng/mL  CARDIAC MARKERS ( 22 Feb 2020 15:48 )  x     / x     / 125 U/L / x     / x              RADIOLOGY & ADDITIONAL TESTS:  Results Reviewed:   Imaging Personally Reviewed:  Electrocardiogram Personally Reviewed:    COORDINATION OF CARE:  Care Discussed with Consultants/Other Providers [Y/N]:  Prior or Outpatient Records Reviewed [Y/N]:

## 2020-02-24 NOTE — PROGRESS NOTE ADULT - ATTENDING COMMENTS
#Advanced care planning.   -face to face conversation with patient and daughter, Jing.  disussed code status, wants patient to be DNR/DNI.  Limited intervention, no procedures.  ok with patient going to rehab to regain strength.

## 2020-02-24 NOTE — PROGRESS NOTE ADULT - PROBLEM SELECTOR PLAN 3
likely in setting of contrast yesterday.  -continue to monitor, give gentle fluid today at 50cc/hr x 10 hours   -trend creatiine.

## 2020-02-24 NOTE — PROGRESS NOTE ADULT - PROBLEM SELECTOR PLAN 2
-stable.   -given conservative management wanted by patient and family, will defer all treatment as will not change managemnet, continue medical management as current

## 2020-02-25 LAB
ANION GAP SERPL CALC-SCNC: 7 MMOL/L — SIGNIFICANT CHANGE UP (ref 5–17)
BUN SERPL-MCNC: 42 MG/DL — HIGH (ref 7–23)
CALCIUM SERPL-MCNC: 8 MG/DL — LOW (ref 8.4–10.5)
CHLORIDE SERPL-SCNC: 107 MMOL/L — SIGNIFICANT CHANGE UP (ref 96–108)
CO2 SERPL-SCNC: 23 MMOL/L — SIGNIFICANT CHANGE UP (ref 22–31)
CREAT SERPL-MCNC: 0.99 MG/DL — SIGNIFICANT CHANGE UP (ref 0.5–1.3)
GLUCOSE SERPL-MCNC: 81 MG/DL — SIGNIFICANT CHANGE UP (ref 70–99)
HCT VFR BLD CALC: 33 % — LOW (ref 34.5–45)
HGB BLD-MCNC: 10.5 G/DL — LOW (ref 11.5–15.5)
MAGNESIUM SERPL-MCNC: 2.3 MG/DL — SIGNIFICANT CHANGE UP (ref 1.6–2.6)
MCHC RBC-ENTMCNC: 31 PG — SIGNIFICANT CHANGE UP (ref 27–34)
MCHC RBC-ENTMCNC: 31.8 GM/DL — LOW (ref 32–36)
MCV RBC AUTO: 97.3 FL — SIGNIFICANT CHANGE UP (ref 80–100)
NRBC # BLD: 0 /100 WBCS — SIGNIFICANT CHANGE UP (ref 0–0)
PLATELET # BLD AUTO: 233 K/UL — SIGNIFICANT CHANGE UP (ref 150–400)
POTASSIUM SERPL-MCNC: 4.8 MMOL/L — SIGNIFICANT CHANGE UP (ref 3.5–5.3)
POTASSIUM SERPL-SCNC: 4.8 MMOL/L — SIGNIFICANT CHANGE UP (ref 3.5–5.3)
RBC # BLD: 3.39 M/UL — LOW (ref 3.8–5.2)
RBC # FLD: 13.6 % — SIGNIFICANT CHANGE UP (ref 10.3–14.5)
SODIUM SERPL-SCNC: 137 MMOL/L — SIGNIFICANT CHANGE UP (ref 135–145)
WBC # BLD: 9.47 K/UL — SIGNIFICANT CHANGE UP (ref 3.8–10.5)
WBC # FLD AUTO: 9.47 K/UL — SIGNIFICANT CHANGE UP (ref 3.8–10.5)

## 2020-02-25 PROCEDURE — 99232 SBSQ HOSP IP/OBS MODERATE 35: CPT

## 2020-02-25 RX ORDER — POLYETHYLENE GLYCOL 3350 17 G/17G
17 POWDER, FOR SOLUTION ORAL DAILY
Refills: 0 | Status: DISCONTINUED | OUTPATIENT
Start: 2020-02-25 | End: 2020-02-27

## 2020-02-25 RX ORDER — SENNA PLUS 8.6 MG/1
2 TABLET ORAL AT BEDTIME
Refills: 0 | Status: DISCONTINUED | OUTPATIENT
Start: 2020-02-25 | End: 2020-02-27

## 2020-02-25 RX ADMIN — Medication 650 MILLIGRAM(S): at 21:04

## 2020-02-25 RX ADMIN — Medication 650 MILLIGRAM(S): at 06:40

## 2020-02-25 RX ADMIN — POLYETHYLENE GLYCOL 3350 17 GRAM(S): 17 POWDER, FOR SOLUTION ORAL at 11:52

## 2020-02-25 RX ADMIN — HEPARIN SODIUM 5000 UNIT(S): 5000 INJECTION INTRAVENOUS; SUBCUTANEOUS at 13:57

## 2020-02-25 RX ADMIN — HEPARIN SODIUM 5000 UNIT(S): 5000 INJECTION INTRAVENOUS; SUBCUTANEOUS at 21:03

## 2020-02-25 RX ADMIN — Medication 650 MILLIGRAM(S): at 22:00

## 2020-02-25 RX ADMIN — Medication 650 MILLIGRAM(S): at 13:40

## 2020-02-25 RX ADMIN — Medication 650 MILLIGRAM(S): at 11:52

## 2020-02-25 RX ADMIN — Medication 650 MILLIGRAM(S): at 05:33

## 2020-02-25 RX ADMIN — HEPARIN SODIUM 5000 UNIT(S): 5000 INJECTION INTRAVENOUS; SUBCUTANEOUS at 05:32

## 2020-02-25 RX ADMIN — SODIUM CHLORIDE 50 MILLILITER(S): 9 INJECTION INTRAMUSCULAR; INTRAVENOUS; SUBCUTANEOUS at 02:33

## 2020-02-25 RX ADMIN — SENNA PLUS 2 TABLET(S): 8.6 TABLET ORAL at 21:03

## 2020-02-25 NOTE — PROGRESS NOTE ADULT - PROBLEM SELECTOR PLAN 1
-non displaced humeral head fracture on x-ray   -Ortho recs noted; No surgical intervention. Maintain sling. NWB RUE  -Continue with pain control with Acetaminophen as needed  -PT eval  -bowel regimen.

## 2020-02-25 NOTE — PROGRESS NOTE ADULT - SUBJECTIVE AND OBJECTIVE BOX
Authored by Dr. Shahab Nixon DO  Pager number [phone]    Patient is a 89y old  Female who presents with a chief complaint of Fall (22 Feb 2020 20:03)      SUBJECTIVE / OVERNIGHT EVENTS:  No acute events overnight. Pain is improving. No BM yet. No other complaints.   Allergies    No Known Allergies    Intolerances        MEDICATIONS  (STANDING):  heparin  Injectable 5000 Unit(s) SubCutaneous every 8 hours    MEDICATIONS  (PRN):  acetaminophen   Tablet .. 650 milliGRAM(s) Oral every 6 hours PRN Moderate Pain (4 - 6)      Vital Signs Last 24 Hrs  T(C): 36.4 (25 Feb 2020 12:00), Max: 36.4 (25 Feb 2020 12:00)  T(F): 97.5 (25 Feb 2020 12:00), Max: 97.5 (25 Feb 2020 12:00)  HR: 83 (25 Feb 2020 12:00) (73 - 83)  BP: 122/72 (25 Feb 2020 12:00) (122/72 - 143/73)  BP(mean): --  RR: 18 (25 Feb 2020 12:00) (18 - 18)  SpO2: 94% (25 Feb 2020 12:00) (94% - 99%)        I&O's Summary    22 Feb 2020 07:01  -  23 Feb 2020 07:00  --------------------------------------------------------  IN: 360 mL / OUT: 0 mL / NET: 360 mL        PHYSICAL EXAM:  GENERAL: NAD, elderly thin female  HEAD:  Atraumatic, Normocephalic  EYES: , conjunctiva and sclera clear  NECK: Supple, No JVD  CHEST/LUNG: Clear to auscultation bilaterally; No wheeze  HEART: Regular rate and rhythm; No murmurs, rubs, or gallops  ABDOMEN: Soft, Nontender, Nondistended; Bowel sounds present  EXTREMITIES:  2+ Peripheral Pulses, No clubbing, cyanosis, or edema  back: No spinal tenderness.   MSK: RUE in sling with limited ROM. 3/5 hip flexor strength b/l. 5/5 dorsi and plantar flexion b/l le.   NEUROLOGY: AAOx3, non-focal  PSYCH: calm  SKIN: ecchymosis on right arm.     LABS:                                   10.5   9.47  )-----------( 233      ( 25 Feb 2020 05:56 )             33.0   02-25    137  |  107  |  42<H>  ----------------------------<  81  4.8   |  23  |  0.99    Ca    8.0<L>      25 Feb 2020 05:56  Mg     2.3     02-25           PTT - ( 22 Feb 2020 15:48 )  PTT:27.8 sec  CARDIAC MARKERS ( 22 Feb 2020 23:10 )  x     / x     / 69 U/L / x     / 7.7 ng/mL  CARDIAC MARKERS ( 22 Feb 2020 15:48 )  x     / x     / 125 U/L / x     / x              RADIOLOGY & ADDITIONAL TESTS:  Results Reviewed:   Imaging Personally Reviewed:  Electrocardiogram Personally Reviewed:    COORDINATION OF CARE:  Care Discussed with Consultants/Other Providers [Y/N]:  Prior or Outpatient Records Reviewed [Y/N]:

## 2020-02-25 NOTE — PROGRESS NOTE ADULT - PROBLEM SELECTOR PLAN 3
likely in setting of contrast 48 hours ago, stable.   -s/p fluid  -elevated BUN ratio, unclear etiology, s/p fluid, no evidence of GI bleed, could be catabolic state 2/2 fracture and immobility.

## 2020-02-26 ENCOUNTER — TRANSCRIPTION ENCOUNTER (OUTPATIENT)
Age: 85
End: 2020-02-26

## 2020-02-26 PROCEDURE — 99232 SBSQ HOSP IP/OBS MODERATE 35: CPT

## 2020-02-26 RX ORDER — TRAMADOL HYDROCHLORIDE 50 MG/1
0.5 TABLET ORAL
Qty: 0 | Refills: 0 | DISCHARGE
Start: 2020-02-26

## 2020-02-26 RX ORDER — SENNA PLUS 8.6 MG/1
2 TABLET ORAL
Qty: 0 | Refills: 0 | DISCHARGE
Start: 2020-02-26

## 2020-02-26 RX ORDER — FUROSEMIDE 40 MG
1 TABLET ORAL
Qty: 0 | Refills: 0 | DISCHARGE

## 2020-02-26 RX ORDER — POLYETHYLENE GLYCOL 3350 17 G/17G
17 POWDER, FOR SOLUTION ORAL
Qty: 0 | Refills: 0 | DISCHARGE
Start: 2020-02-26

## 2020-02-26 RX ADMIN — HEPARIN SODIUM 5000 UNIT(S): 5000 INJECTION INTRAVENOUS; SUBCUTANEOUS at 21:26

## 2020-02-26 RX ADMIN — POLYETHYLENE GLYCOL 3350 17 GRAM(S): 17 POWDER, FOR SOLUTION ORAL at 10:58

## 2020-02-26 RX ADMIN — Medication 650 MILLIGRAM(S): at 05:42

## 2020-02-26 RX ADMIN — HEPARIN SODIUM 5000 UNIT(S): 5000 INJECTION INTRAVENOUS; SUBCUTANEOUS at 15:02

## 2020-02-26 RX ADMIN — Medication 650 MILLIGRAM(S): at 21:25

## 2020-02-26 RX ADMIN — Medication 10 MILLIGRAM(S): at 10:56

## 2020-02-26 RX ADMIN — SENNA PLUS 2 TABLET(S): 8.6 TABLET ORAL at 21:26

## 2020-02-26 RX ADMIN — Medication 650 MILLIGRAM(S): at 06:00

## 2020-02-26 RX ADMIN — Medication 650 MILLIGRAM(S): at 21:55

## 2020-02-26 RX ADMIN — HEPARIN SODIUM 5000 UNIT(S): 5000 INJECTION INTRAVENOUS; SUBCUTANEOUS at 05:41

## 2020-02-26 NOTE — DISCHARGE NOTE PROVIDER - NSDCMRMEDTOKEN_GEN_ALL_CORE_FT
acetaminophen 325 mg oral tablet: 2 tab(s) orally every 6 hours, As needed, Mild Pain  polyethylene glycol 3350 oral powder for reconstitution: 17 gram(s) orally once a day  senna oral tablet: 2 tab(s) orally once a day (at bedtime)  traMADol 50 mg oral tablet: 0.5 tab(s) orally every 8 hours, As needed, Severe Pain (7 - 10) acetaminophen 325 mg oral tablet: 2 tab(s) orally every 6 hours, As needed, Mild Pain  polyethylene glycol 3350 oral powder for reconstitution: 17 gram(s) orally once a day  senna oral tablet: 2 tab(s) orally once a day (at bedtime)

## 2020-02-26 NOTE — DISCHARGE NOTE PROVIDER - NSDCFUADDINST_GEN_ALL_CORE_FT
No surgical intervention warranted. Please maintain sling in place and maintain a non weight bearing status to right upper extremity.

## 2020-02-26 NOTE — DISCHARGE NOTE PROVIDER - CARE PROVIDER_API CALL
Nadja Manriquez (MD)  Orthopaedic Surgery  [address] [place] [address], [address]  [place] [address]  Phone: [phone]  Fax: [phone]  Follow Up Time: Nadja Manriquez)  Orthopaedic Surgery  [address] [place] [address], [address]  [place] [address]  Phone: [phone]  Fax: [phone]  Follow Up Time:     House, calls  From  [facility]  Phone: (   )    -  Fax: (   )    -  Follow Up Time:

## 2020-02-26 NOTE — OCCUPATIONAL THERAPY INITIAL EVALUATION ADULT - LIVES WITH, PROFILE
Pt lives in the first floor of a house alone; her granddaughter lives upstairs however works all day. Pt has no stairs to negotiate at home, +walk-in shower. Pt reports her daughter lives around the block and comes over multiple times a day to assist with ADL/IADL

## 2020-02-26 NOTE — OCCUPATIONAL THERAPY INITIAL EVALUATION ADULT - PERTINENT HX OF CURRENT PROBLEM, REHAB EVAL
89 yr old F w/ no signficant past medical history presents after being found on the floor.  As per her daughter she went over to her house to check up on her and found her on the floor, she normally is supposed to use a walker to walk however it appears today she got out of bed without using a walker, she found her down it is unclear how long she was down for. Found to have Acute comminuted mildly impacted right surgical humeral neck fracture.

## 2020-02-26 NOTE — DISCHARGE NOTE PROVIDER - NSDCCPCAREPLAN_GEN_ALL_CORE_FT
PRINCIPAL DISCHARGE DIAGNOSIS  Diagnosis: Humeral head fracture, right, closed, initial encounter  Assessment and Plan of Treatment: You have a non displaced humeral head fracture on x-ray   You were seen by Orthopedics - No surgical intervention warranted. Please maintain sling in place and maintain a non weight bearing status to right upper extremity.   Please continue with pain control with Acetaminophen as needed  Continue physical therapy in Rehab.         SECONDARY DISCHARGE DIAGNOSES  Diagnosis: YEN (acute kidney injury)  Assessment and Plan of Treatment: Will continue to hold Lasix upon discharge.   Kidney function improving please follow up to continue to monitor kidney function.

## 2020-02-26 NOTE — DISCHARGE NOTE PROVIDER - PROVIDER TOKENS
PROVIDER:[TOKEN:[[id number]]] PROVIDER:[TOKEN:[[id number]]],FREE:[LAST:[House],FIRST:[calls],PHONE:[(   )    -],FAX:[(   )    -],ADDRESS:[From  [facility]]]

## 2020-02-26 NOTE — DISCHARGE NOTE PROVIDER - HOSPITAL COURSE
89 yr old F w/ no significant past medical history presented after fall found to have R humeral head fracture. Admitted for sp Fall w/ right humeral head fx and YEN. Seen and followed by orthopedics, no surgical interventions warranted . Patient to maintain sling RUE in sling with a NWB status. Patient to continue with pain control with Acetaminophen as needed with disposition to rehab. Will continue to hold Lasix as outpt with close monitoring of kidney function. Patient is stable for discharge to rehab.

## 2020-02-26 NOTE — PROGRESS NOTE ADULT - PROBLEM SELECTOR PLAN 2
-stable.   -given conservative management wanted by patient and family, will defer all treatment as will not change managemnet, continue medical management as current  -no TTE as even if severe AS, no procedure, patient saturating well on RA, no significant murmur on exam.

## 2020-02-26 NOTE — PROGRESS NOTE ADULT - SUBJECTIVE AND OBJECTIVE BOX
Authored by Dr. Shahab Nixon DO  Pager number [phone]    Patient is a 89y old  Female who presents with a chief complaint of Fall (22 Feb 2020 20:03)      SUBJECTIVE / OVERNIGHT EVENTS:  No acute events overnight. Eating breakfast when I came in.  Pain is improving. No BM yet. No other complaints.   Allergies    No Known Allergies    Intolerances        MEDICATIONS  (STANDING):  heparin  Injectable 5000 Unit(s) SubCutaneous every 8 hours    MEDICATIONS  (PRN):  acetaminophen   Tablet .. 650 milliGRAM(s) Oral every 6 hours PRN Moderate Pain (4 - 6)      Vital Signs Last 24 Hrs  T(C): 37.1 (26 Feb 2020 08:11), Max: 37.1 (26 Feb 2020 08:11)  T(F): 98.7 (26 Feb 2020 08:11), Max: 98.7 (26 Feb 2020 08:11)  HR: 83 (26 Feb 2020 08:11) (75 - 83)  BP: 132/80 (26 Feb 2020 08:11) (127/75 - 145/79)  BP(mean): --  RR: 18 (26 Feb 2020 08:11) (17 - 18)  SpO2: 95% (26 Feb 2020 08:11) (95% - 96%)    I&O's Summary    22 Feb 2020 07:01  -  23 Feb 2020 07:00  --------------------------------------------------------  IN: 360 mL / OUT: 0 mL / NET: 360 mL        PHYSICAL EXAM:  GENERAL: NAD, elderly thin female  HEAD:  Atraumatic, Normocephalic  CHEST/LUNG: Clear to auscultation bilaterally; No wheeze  HEART: Regular rate and rhythm; No murmurs, rubs, or gallops. Prominant S1  ABDOMEN: Soft, Nontender, Nondistended; Bowel sounds present  EXTREMITIES:  2+ Peripheral Pulses, No clubbing, cyanosis, or edema  back: No spinal tenderness.   MSK: RUE in sling with limited ROM. 3/5 hip flexor strength b/l. 5/5 dorsi and plantar flexion b/l le.   NEUROLOGY: AAOx3, non-focal  PSYCH: calm  SKIN: ecchymosis on right arm.     LABS:                                              10.5   9.47  )-----------( 233      ( 25 Feb 2020 05:56 )             33.0   02-25    137  |  107  |  42<H>  ----------------------------<  81  4.8   |  23  |  0.99    Ca    8.0<L>      25 Feb 2020 05:56  Mg     2.3     02-25             PTT - ( 22 Feb 2020 15:48 )  PTT:27.8 sec  CARDIAC MARKERS ( 22 Feb 2020 23:10 )  x     / x     / 69 U/L / x     / 7.7 ng/mL  CARDIAC MARKERS ( 22 Feb 2020 15:48 )  x     / x     / 125 U/L / x     / x              RADIOLOGY & ADDITIONAL TESTS:  Results Reviewed:   Imaging Personally Reviewed:  Electrocardiogram Personally Reviewed:    COORDINATION OF CARE:  Care Discussed with Consultants/Other Providers [Y/N]:  Prior or Outpatient Records Reviewed [Y/N]:

## 2020-02-26 NOTE — OCCUPATIONAL THERAPY INITIAL EVALUATION ADULT - DIAGNOSIS, OT EVAL
Pt presents with impaired cognition, NWB R UE, decreased ROM, strength, endurance, balance, and coordination, all impacting ability to perform ADLs and functional mobility.

## 2020-02-26 NOTE — OCCUPATIONAL THERAPY INITIAL EVALUATION ADULT - ADL RETRAINING, OT EVAL
GOAL: Patient will require MIN A with UB dressing within 4 weeks GOAL: Patient will perform grooming standing sink level with SUP in 4 weeks

## 2020-02-26 NOTE — DISCHARGE NOTE PROVIDER - NSDCFUADDAPPT_GEN_ALL_CORE_FT
Please follow up in office within 5-7 days of discharge with Dr. Manriquez. Call office for appointment. Please follow up in office within 5-7 days of discharge with Dr. Manriquez. Call office for appointment.  Please follow up with  [facility] calls  after discharge form Rehab

## 2020-02-26 NOTE — PROGRESS NOTE ADULT - PROBLEM SELECTOR PLAN 1
-non displaced humeral head fracture on x-ray   -Ortho recs noted; No surgical intervention. Maintain sling. NWB RUE  -Continue with pain control with Acetaminophen as needed  -PT eval  -bowel regimen-->add duclulox suppository today for BM prior to d/c.

## 2020-02-27 ENCOUNTER — TRANSCRIPTION ENCOUNTER (OUTPATIENT)
Age: 85
End: 2020-02-27

## 2020-02-27 VITALS — WEIGHT: 99.87 LBS

## 2020-02-27 PROCEDURE — 80053 COMPREHEN METABOLIC PANEL: CPT

## 2020-02-27 PROCEDURE — 97116 GAIT TRAINING THERAPY: CPT

## 2020-02-27 PROCEDURE — 73070 X-RAY EXAM OF ELBOW: CPT

## 2020-02-27 PROCEDURE — 72170 X-RAY EXAM OF PELVIS: CPT

## 2020-02-27 PROCEDURE — 85027 COMPLETE CBC AUTOMATED: CPT

## 2020-02-27 PROCEDURE — 82550 ASSAY OF CK (CPK): CPT

## 2020-02-27 PROCEDURE — 99285 EMERGENCY DEPT VISIT HI MDM: CPT

## 2020-02-27 PROCEDURE — 73020 X-RAY EXAM OF SHOULDER: CPT

## 2020-02-27 PROCEDURE — 85610 PROTHROMBIN TIME: CPT

## 2020-02-27 PROCEDURE — 80048 BASIC METABOLIC PNL TOTAL CA: CPT

## 2020-02-27 PROCEDURE — 82553 CREATINE MB FRACTION: CPT

## 2020-02-27 PROCEDURE — 93005 ELECTROCARDIOGRAM TRACING: CPT

## 2020-02-27 PROCEDURE — 97162 PT EVAL MOD COMPLEX 30 MIN: CPT

## 2020-02-27 PROCEDURE — 83735 ASSAY OF MAGNESIUM: CPT

## 2020-02-27 PROCEDURE — 36415 COLL VENOUS BLD VENIPUNCTURE: CPT

## 2020-02-27 PROCEDURE — 85730 THROMBOPLASTIN TIME PARTIAL: CPT

## 2020-02-27 PROCEDURE — 73590 X-RAY EXAM OF LOWER LEG: CPT

## 2020-02-27 PROCEDURE — 97166 OT EVAL MOD COMPLEX 45 MIN: CPT

## 2020-02-27 PROCEDURE — 70450 CT HEAD/BRAIN W/O DYE: CPT

## 2020-02-27 PROCEDURE — 72125 CT NECK SPINE W/O DYE: CPT

## 2020-02-27 PROCEDURE — 84484 ASSAY OF TROPONIN QUANT: CPT

## 2020-02-27 PROCEDURE — 99239 HOSP IP/OBS DSCHRG MGMT >30: CPT

## 2020-02-27 PROCEDURE — 73030 X-RAY EXAM OF SHOULDER: CPT

## 2020-02-27 PROCEDURE — 71045 X-RAY EXAM CHEST 1 VIEW: CPT

## 2020-02-27 PROCEDURE — 97530 THERAPEUTIC ACTIVITIES: CPT

## 2020-02-27 PROCEDURE — 71275 CT ANGIOGRAPHY CHEST: CPT

## 2020-02-27 PROCEDURE — 83880 ASSAY OF NATRIURETIC PEPTIDE: CPT

## 2020-02-27 RX ADMIN — HEPARIN SODIUM 5000 UNIT(S): 5000 INJECTION INTRAVENOUS; SUBCUTANEOUS at 05:17

## 2020-02-27 RX ADMIN — TRAMADOL HYDROCHLORIDE 25 MILLIGRAM(S): 50 TABLET ORAL at 01:46

## 2020-02-27 RX ADMIN — TRAMADOL HYDROCHLORIDE 25 MILLIGRAM(S): 50 TABLET ORAL at 01:16

## 2020-02-27 NOTE — CHART NOTE - NSCHARTNOTEFT_GEN_A_CORE
Patient seen and evaluated. no complaints. 3-4/5 lower extremity strenght without any spinal tenderness. Had + BM yesterday. Vital signs stable. Phsycial exam non-pertientn except for stable right arm ecchymosis.  Patient is stable for discharge to rehab with outpatient follow up with house calls.  Patient is DNR/DNI, molst form filled out prior in admission.     D/C planning 36 minutes.

## 2020-02-27 NOTE — DIETITIAN INITIAL EVALUATION ADULT. - PERTINENT MEDS FT
acetaminophen   Tablet .. 650 PRN  heparin  Injectable 5000  polyethylene glycol 3350 17  senna 2  traMADol 25 PRN

## 2020-02-27 NOTE — DISCHARGE NOTE NURSING/CASE MANAGEMENT/SOCIAL WORK - NSDCVIVACCINE_GEN_ALL_CORE_FT
Influenza , 2013/12/23 16:46 , Bryanna Mccann (RN)  Influenza , 2016/1/1 14:02 , Suni Roach (RN)  Pneumococcal , 2013/12/23 16:47 , Bryanna Mccann (RN)

## 2020-02-27 NOTE — DISCHARGE NOTE NURSING/CASE MANAGEMENT/SOCIAL WORK - PATIENT PORTAL LINK FT
You can access the FollowMyHealth Patient Portal offered by [facility] by registering at the following website: [email or web address]. By joining [employer]’s FollowMyHealth portal, you will also be able to view your health information using other applications (apps) compatible with our system.

## 2020-02-27 NOTE — DIETITIAN INITIAL EVALUATION ADULT. - OTHER INFO
as per daughter-plan for pt to be discharged to rehab today.   Reason for admission : Fall  Diet PTA : croissant at breakfast, usually one meal daily-pasta, eggplant, chinese. she snacks on donuts. she lives in a mother-daughter house with her grand-daughter upstairs and her daughter prepares her meals.   Intake : >75%  daughter Denies nausea/vomit :  daughter Denies difficulty chewing /swallow :  Last BM : yesterday  NKFA  IBW +/- 10%= 98pounds   Ht: 59" as per daughter  Ht taken from daughter statement  Usual Weight PTA: ~100pounds  BMI: 19.9  BMI calculated using wt from flow sheet on 2/27  BMI calculated using ht from daughter statement  Education Provided : N/A  skin: no pressure injury  edema: +2 right leg, left leg

## 2020-02-27 NOTE — DISCHARGE NOTE NURSING/CASE MANAGEMENT/SOCIAL WORK - NSDCFUADDAPPT_GEN_ALL_CORE_FT
Please follow up in office within 5-7 days of discharge with Dr. Manriquez. Call office for appointment.  Please follow up with  [facility] calls  after discharge form Rehab

## 2020-03-24 ENCOUNTER — APPOINTMENT (OUTPATIENT)
Age: 85
End: 2020-03-24

## 2020-03-24 ENCOUNTER — TRANSCRIPTION ENCOUNTER (OUTPATIENT)
Age: 85
End: 2020-03-24

## 2020-03-24 DIAGNOSIS — R53.83 OTHER FATIGUE: ICD-10-CM

## 2020-03-24 RX ORDER — AMOXICILLIN AND CLAVULANATE POTASSIUM 500; 125 MG/1; MG/1
500-125 TABLET, FILM COATED ORAL 3 TIMES DAILY
Refills: 0 | Status: ACTIVE | COMMUNITY
Start: 2020-03-24

## 2020-03-25 ENCOUNTER — TRANSCRIPTION ENCOUNTER (OUTPATIENT)
Age: 85
End: 2020-03-25

## 2020-03-25 DIAGNOSIS — R06.82 TACHYPNEA, NOT ELSEWHERE CLASSIFIED: ICD-10-CM

## 2020-03-25 RX ORDER — MORPHINE SULFATE 10 MG/5ML
10 SOLUTION ORAL
Qty: 30 | Refills: 0 | Status: ACTIVE | COMMUNITY
Start: 2020-03-25 | End: 1900-01-01

## 2021-08-06 NOTE — PHYSICAL THERAPY INITIAL EVALUATION ADULT - PERTINENT HX OF CURRENT PROBLEM, REHAB EVAL
Continue current javier, keep allergist follow up    troponins elevated 245, 219, 199 ; WBC 11.86 ; pt is s/p fall with head trauma ; XRAYS done for pelvic / R shoulder / R elbow/ R tibia/fibula/ CT Cervical spine , HCT revealed : age appropriate changes for HCT , + humeral R surgical neck fx comminuted impacted , chronic healed L sup/inf pubic ramus fx , chronic 6th-7th rib fx ; no surgical intervention re R shoulder per Ortho

## 2022-05-30 NOTE — PHYSICAL THERAPY INITIAL EVALUATION ADULT - GAIT DISTANCE, PT EVAL
Alert-The patient is alert, awake and responds to voice. The patient is oriented to time, place, and person. The triage nurse is able to obtain subjective information.
8 steps/bed to chair

## 2023-06-14 NOTE — PROGRESS NOTE ADULT - PROBLEM SELECTOR PLAN 1
Pt came in today for evaluation of CRPS pain with a possible adjustment to Simplesurance IT pain pump with Dr Perales. Vitals taken, medical hx, surgical hx,allergies and medications reviewed.   -non displaced humeral head fracture on x-ray   -Ortho recs noted; No surgical intervention. Maintain sling. NWB RUE  -Continue with pain control with Acetaminophen as needed  -PT eval  -bowel regimen.

## 2023-07-11 NOTE — CHRONIC CARE ASSESSMENT
[PPS Score: ____] : Palliative Performance Scale (PPS) Score: [unfilled] Gabapentin Pregnancy And Lactation Text: This medication is Pregnancy Category C and isn't considered safe during pregnancy. It is excreted in breast milk.

## 2023-07-24 NOTE — ED ADULT NURSE NOTE - EXTENSIONS OF SELF_ADULT
For Your Information   If you are in need of a medication refill please use one of the following options. You can expect your medication to be filled within 2-3 business days.   1. Call your pharmacy for all medication refills and renewals.   2. [email or web address]- [email or web address]  3. Call your providers office    If your provider ordered any imaging for you today. Our pre-scheduling services will be reaching out to you within 2 business days to schedule this. [facility] can be reached by calling [phone]     If your provider ordered testing today, you will be notified of your test results within 3-5 business days unless specified otherwise. If you have not received your results within 5 business days please call your provider's office.    You may be receiving a survey.  Please take the time to complete this, as your feedback is very important to us!  We strive to make your experience exceptional and your comments help us with that goal.  We look forward to hearing from you!    For all future appointments please arrive 15 minutes prior to your scheduled visit.     Patient Contact Center Business Office: assistance with medical billing & financial inquires [phone]        Please bring in a copy of your advance directive at your next visit, or drop off a copy at any [facility] facility so that it can be added to your medical record.    
no
None

## 2025-06-23 NOTE — PROGRESS NOTE ADULT - ASSESSMENT
89 yr old F w/ no significant past medical history presents after fall found to have R humeral head fracture no